# Patient Record
Sex: FEMALE | Race: WHITE | NOT HISPANIC OR LATINO | URBAN - METROPOLITAN AREA
[De-identification: names, ages, dates, MRNs, and addresses within clinical notes are randomized per-mention and may not be internally consistent; named-entity substitution may affect disease eponyms.]

---

## 2022-11-07 ENCOUNTER — OFFICE (OUTPATIENT)
Dept: URBAN - METROPOLITAN AREA CLINIC 72 | Facility: CLINIC | Age: 72
End: 2022-11-07

## 2022-11-07 VITALS
HEART RATE: 68 BPM | OXYGEN SATURATION: 99 % | WEIGHT: 150 LBS | HEIGHT: 67 IN | DIASTOLIC BLOOD PRESSURE: 82 MMHG | SYSTOLIC BLOOD PRESSURE: 144 MMHG

## 2022-11-07 DIAGNOSIS — K52.9 NONINFECTIVE GASTROENTERITIS AND COLITIS, UNSPECIFIED: ICD-10-CM

## 2022-11-07 DIAGNOSIS — I10 ESSENTIAL (PRIMARY) HYPERTENSION: ICD-10-CM

## 2022-11-07 DIAGNOSIS — Z86.010 PERSONAL HISTORY OF COLONIC POLYPS: ICD-10-CM

## 2022-11-07 PROCEDURE — 99204 OFFICE O/P NEW MOD 45 MIN: CPT | Performed by: NURSE PRACTITIONER

## 2023-01-03 ENCOUNTER — AMBULATORY SURGICAL CENTER (OUTPATIENT)
Dept: URBAN - METROPOLITAN AREA SURGERY 19 | Facility: SURGERY | Age: 73
End: 2023-01-03
Payer: COMMERCIAL

## 2023-01-03 ENCOUNTER — OFFICE (OUTPATIENT)
Dept: URBAN - METROPOLITAN AREA PATHOLOGY 24 | Facility: PATHOLOGY | Age: 73
End: 2023-01-03
Payer: COMMERCIAL

## 2023-01-03 DIAGNOSIS — K52.832 LYMPHOCYTIC COLITIS: ICD-10-CM

## 2023-01-03 LAB
COLONOSCOPY STUDY: (no result)
COLONOSCOPY STUDY: (no result)

## 2023-01-03 PROCEDURE — 88305 TISSUE EXAM BY PATHOLOGIST: CPT | Performed by: PATHOLOGY

## 2023-01-03 PROCEDURE — 45380 COLONOSCOPY AND BIOPSY: CPT | Performed by: INTERNAL MEDICINE

## 2025-07-23 ENCOUNTER — OFFICE (OUTPATIENT)
Dept: URBAN - METROPOLITAN AREA CLINIC 72 | Facility: CLINIC | Age: 75
End: 2025-07-23
Payer: MEDICARE

## 2025-07-23 VITALS
WEIGHT: 151 LBS | HEART RATE: 60 BPM | OXYGEN SATURATION: 99 % | HEIGHT: 67 IN | SYSTOLIC BLOOD PRESSURE: 118 MMHG | DIASTOLIC BLOOD PRESSURE: 70 MMHG

## 2025-07-23 DIAGNOSIS — R19.7 DIARRHEA, UNSPECIFIED: ICD-10-CM

## 2025-07-23 DIAGNOSIS — K52.832 LYMPHOCYTIC COLITIS: ICD-10-CM

## 2025-07-23 DIAGNOSIS — Z09 ENCOUNTER FOR FOLLOW-UP EXAMINATION AFTER COMPLETED TREATMEN: ICD-10-CM

## 2025-07-23 DIAGNOSIS — Z86.0100 PERSONAL HISTORY OF COLON POLYPS, UNSPECIFIED: ICD-10-CM

## 2025-07-23 PROCEDURE — 99214 OFFICE O/P EST MOD 30 MIN: CPT | Performed by: INTERNAL MEDICINE

## 2025-07-23 RX ORDER — BUDESONIDE 3 MG/1
CAPSULE ORAL
Qty: 174 | Refills: 0 | Status: ACTIVE
Start: 2025-07-23

## 2025-07-23 NOTE — SERVICENOTES
Our goal is to partner with you to improve your health and well being. It is important for you to complete necessary testing and follow the instructions given to you at your clinic visit. Our office will call you within 2 weeks with results of any testing but you may also call sooner to obtain results [phone].   If you have any questions or concerns please feel free to call.  We take your care very seriously and we thank you for your trust!
-, I recommend a trial off all no calorie and artificial sweeteners (aspartame, equal, stevia, Splenda, sugar alcohol, sweet and low etc) including all diet drinks (diet soda, crystal light, propel, G2 ect) and sugar free candies or other foods (yogurt, pudding ect).  Try this for 2 weeks and then you can add them back and see what symptoms come back or could be due to these sweeteners.  Stevia may cause less symptoms but can still be the culprit.  Try using water with an ounce of fruit juice or lemon
- budesonide 3 pills a day for 1 month, then 2 pills a day for 1 month then 1 pill a day for 1 month then stop
- you can use immodium up to 6 pills a day and pepto bismal up to 6 pills a day.  It is OK to alternate those.  pepto will turn your stool black.  If you notice worsening ringing in your ears or other hearing issues then stop the pepto .  
- Try taking 2 immodium when you wake up and 1 before bed.  You can also take an additional 3 throughout the day if needed.

## 2025-07-23 NOTE — SERVICEHPINOTES
Riana Scott   is seen today for a follow-up visit.  
br

br initial visit 11/22brPleasant 71-year-old self-referral for diarrhea. she moved from [place] 5/2022. [place] and drinks well water for the first few months. She's had diarrhea since moving here. She has diarrhea most days occurred during the day and at night. She has mild cramping. The stool is watery and urgent. She started VSL#3 x 1 week. It is helping slightly.. She denies signs of GI bleeding, or unintentional weight loss.She reports a history of colon polyps in [place]. Her last colonoscopy was in 2017 and told to repeat in 5 years.She is a retired OB/GYN nurse practitioner.    br  Plan from last visit:
brscreen stool for infection and labs for celiac. then schedule colonoscopy if negative. If TTG elevated, schedule EGD Interval History:  7/23/2025  br1/23 colon IH , nml TI path consistent with Lymphocytic colitis
br
br referred now for worsening ulcerative colitis 
brShe was not aware or the diagnosis of lymphoocytic colits and thought she had ulcerative colitis. 
br She doesnt know if she took falgyl or budesonide.  She doesnt think she has. 

She can't stand water so she uses aren drops. br She has frequent diarrhea with incotninece . The diarrhea wakes her up at night.  She has taken immodium but it doesnt really help.  br
br she takes meloxicam daily for arthritis.  
My nurse has reviewed and updated the medication list with the patient (medication reconciliation). I have also reviewed the medication list. New updates were made to the patient's medical, social and family history. Pertinent details are also noted above in the HPI.br visited="true"